# Patient Record
Sex: MALE | Race: BLACK OR AFRICAN AMERICAN | NOT HISPANIC OR LATINO | URBAN - METROPOLITAN AREA
[De-identification: names, ages, dates, MRNs, and addresses within clinical notes are randomized per-mention and may not be internally consistent; named-entity substitution may affect disease eponyms.]

---

## 2021-07-01 ENCOUNTER — EMERGENCY (EMERGENCY)
Facility: HOSPITAL | Age: 41
LOS: 1 days | Discharge: HOME | End: 2021-07-01
Attending: STUDENT IN AN ORGANIZED HEALTH CARE EDUCATION/TRAINING PROGRAM | Admitting: STUDENT IN AN ORGANIZED HEALTH CARE EDUCATION/TRAINING PROGRAM
Payer: MEDICAID

## 2021-07-01 VITALS
SYSTOLIC BLOOD PRESSURE: 128 MMHG | HEART RATE: 72 BPM | TEMPERATURE: 98 F | RESPIRATION RATE: 18 BRPM | DIASTOLIC BLOOD PRESSURE: 61 MMHG | OXYGEN SATURATION: 98 %

## 2021-07-01 PROCEDURE — 99284 EMERGENCY DEPT VISIT MOD MDM: CPT

## 2021-07-01 PROCEDURE — 73090 X-RAY EXAM OF FOREARM: CPT | Mod: 26,RT

## 2021-07-01 PROCEDURE — 73130 X-RAY EXAM OF HAND: CPT | Mod: 26,RT

## 2021-07-01 PROCEDURE — 73110 X-RAY EXAM OF WRIST: CPT | Mod: 26,RT

## 2021-07-01 PROCEDURE — 99281 EMR DPT VST MAYX REQ PHY/QHP: CPT

## 2021-07-01 NOTE — ED PROVIDER NOTE - ATTENDING CONTRIBUTION TO CARE
Frandy Lara DO:  patient seen and evaluated with the resident.  I was present for key portions of the History & Physical, and I agree with the Impression & Plan. 40 yo m w/ r wrist pain/swelling s/p mechanical fall while running yesterday. worsening pain today. fx seen on outpatient imaging. neurovascularly intact. mild deformity and circumferential noted at wrist. r handed, works as . pain controlled. xr, likely ortho consult for possible reduction and splint. reassess.

## 2021-07-01 NOTE — ED PROVIDER NOTE - NS ED ROS FT
General: no fever, no chills  Eyes: no vision changes, no eye pain  Cardiovascular: no chest pain, no edema  Respiratory: no cough, no shortness of breath  Gastrointestinal: no abdominal pain, no nausea, no vomiting, no diarrhea  Genitourinary: no dysuria, no hematuria  Musculoskeletal: +rt wrist pain, no elbow pain   Skin: no rash, no lesions  Neuro: no numbness, no tingling  Psych: no depression, no anxiety

## 2021-07-01 NOTE — ED PROVIDER NOTE - OBJECTIVE STATEMENT
41 year old healthy male, no pmhx, sent to ED from  for evaluation of rt distal radius and ulna fx. Pt reports tripping and falling yesterday, stretched out hand to break fall and then having pain. He denies pain anywhere besides rt wrist. Went to  this AM and had xrays with fx and placed in volar splint. Pt denies any numbness or tingling. No blood thinners. No daily meds. Received Toradol and Tetanus (unclear reason why) at  prior to arrival. Rt hand dominant, works as a .

## 2021-07-01 NOTE — ED PROVIDER NOTE - PROGRESS NOTE DETAILS
Frandy Lara DO: pt signed out pending ortho assessment. Ajay Zamarripa, PGY-2- Patient seen and evaluated by ortho- fracture reduced and splinted. Pt advised to f/u in office. Discussed results of work up with patient. Patient agrees with plan to discharge home. All questions answered regarding plan. Strict return precautions given.

## 2021-07-01 NOTE — CONSULT NOTE ADULT - SUBJECTIVE AND OBJECTIVE BOX
HPI: 41y year old Male RHD s/p mechanical fall.  Landed on outstretched hand.  Patient had deformity and pain in right wrist.  Patient denies pain in any other joint, numbness, tingling, paresthesias. No pain in any other extremities.  No Head trauma or LOC.    PMH:  No pertinent past medical history      [ ] No past medical history    PSH:    [ ] No past surgical history    Allergies:  No Known Allergies      O:  T(C): 36.9 (07-01-21 @ 13:04), Max: 36.9 (07-01-21 @ 13:04)  HR: 72 (07-01-21 @ 13:04) (72 - 72)  BP: 128/61 (07-01-21 @ 13:04) (128/61 - 128/61)  RR: 18 (07-01-21 @ 13:04) (18 - 18)  SpO2: 98% (07-01-21 @ 13:04) (98% - 98%)    Gen  RUE/LUE  AIN/PIN/U Intact  SILT M/U/R  Radial pulse palpable, WWP distally  Skin intact  Swollen wrist        Patient underwent hematoma block.  Injected with 10 cc of 1% lidocaine without epinephrine into hematoma.  Hung in traction and reduced. Patient placed in sugartong splint.  Post reduction x-rays reviewed in improved alignment.  A/P 41y year old man with left/right distal radius fracture  Pain Control  NWB RUE  Sling for comfort  F/u as outpatient in 1 week with Dr Gonzalez [phone]    HPI: 41y year old Male RHD s/p mechanical fall.  Landed on outstretched hand.  Patient had deformity and pain in right wrist.  Patient denies pain in any other joint, numbness, tingling, paresthesias. No pain in any other extremities.  No Head trauma or LOC.    PMH:  No pertinent past medical history      [ ] No past medical history    PSH:    [ ] No past surgical history    Allergies:  No Known Allergies      O:  T(C): 36.9 (07-01-21 @ 13:04), Max: 36.9 (07-01-21 @ 13:04)  HR: 72 (07-01-21 @ 13:04) (72 - 72)  BP: 128/61 (07-01-21 @ 13:04) (128/61 - 128/61)  RR: 18 (07-01-21 @ 13:04) (18 - 18)  SpO2: 98% (07-01-21 @ 13:04) (98% - 98%)    Gen  RUE/LUE  AIN/PIN/U Intact  SILT M/U/R  Radial pulse palpable, WWP distally  Skin intact  Swollen wrist    R wrist XR reveal distal radius fracture with mild shortening         Patient underwent hematoma block.  Injected with 10 cc of 1% lidocaine without epinephrine into hematoma.  Hung in traction and reduced. Patient placed in sugartong splint.  Post reduction x-rays reviewed in improved alignment.  A/P 41y year old man with left/right distal radius fracture  Pain Control  NWB CLINTONE  Sling for comfort  F/u as outpatient in 1 week with Dr Gonzalez [phone]

## 2021-07-01 NOTE — ED ADULT TRIAGE NOTE - CHIEF COMPLAINT QUOTE
sent for further eval of right arm/wrist fracture sent from urgent center  was running and tripped and fall yesterday

## 2021-07-01 NOTE — ED PROVIDER NOTE - NSFOLLOWUPINSTRUCTIONS_ED_ALL_ED_FT
1) Follow up with your orthopedist within 1 week of being seen in the Emergency Department   2) Return to the ED immediately for new or worsening symptoms severe pain, numbness, tingling, color change of arm  3) Please continue to take any home medications as prescribed  4) Your test results from your ED visit were discussed with you prior to discharge  5) You were provided with a copy of your test results  6) Please keep arm elevated, rest, no weight bearing or lifting, take Tylenol and/or Motrin for pain as needed

## 2021-07-01 NOTE — ED PROVIDER NOTE - PHYSICAL EXAMINATION
General: well appearing, no acute distress, AOx3  Skin: no rash, no pallor  Head: normocephalic, atraumatic  Eyes: clear conjunctiva, EOMI  ENMT: airway patent, no nasal discharge  Cardiovascular: normal rate, normal rhythm, S1/S2, 2+ radial pulse rt   Pulmonary: clear to auscultation bilaterally, no rales, rhonchi, or wheeze  Abdomen: soft, nontender  Musculoskeletal: moving extremities well, dorsiflexion of rt wrist, normal sensory, moving digits and elbow well, tender to palpation at rt wrist, normal sensory   Psych: normal mood, normal affect General: well appearing, no acute distress, AOx3  Skin: no rash, no pallor  Head: normocephalic, atraumatic  Eyes: clear conjunctiva, EOMI  ENMT: airway patent, no nasal discharge  Cardiovascular: normal rate, normal rhythm, S1/S2, 2+ radial pulse rt   Pulmonary: clear to auscultation bilaterally, no rales, rhonchi, or wheeze  Abdomen: soft, nontender  Musculoskeletal: moving extremities well, dorsiflexion of rt wrist, normal sensory, moving digits and elbow well, tender to palpation at rt wrist, normal sensory, NO anatomic snuffbox ttp b/l   Psych: normal mood, normal affect  hand: Sensation: SILT in FDWS (radial), SF volar tip (ulnar), and IF (median)  Motor: + TU (radial), OK sign (median), X 2-3 (ulnar), F&E 1-5 against resistance, Wrist/finger extension off table (radial). Finger AB/AD-duction (ulnar), Thumb to pinky (median).   Vascular: CR<2s in all digits

## 2021-07-01 NOTE — ED PROVIDER NOTE - PATIENT PORTAL LINK FT
You can access the FollowMyHealth Patient Portal offered by [facility] by registering at the following website: [email or web address]. By joining [employer]’s FollowMyHealth portal, you will also be able to view your health information using other applications (apps) compatible with our system.

## 2021-07-01 NOTE — ED PROVIDER NOTE - NSFOLLOWUPCLINICS_GEN_ALL_ED_FT
[facility] Orthopedic Surgery  Orthopedic Surgery  [address], [address] [place] [address]  Phone: [phone]  Fax:   Follow Up Time: 1-3 Days

## 2021-07-01 NOTE — ED PROVIDER NOTE - CLINICAL SUMMARY MEDICAL DECISION MAKING FREE TEXT BOX
41 year old male s/p fall yesterday, confirmed radius and ulna right wrist fx on XR. Will repeat XR here, possibly consult ortho, reeval

## 2021-07-02 PROBLEM — Z00.00 ENCOUNTER FOR PREVENTIVE HEALTH EXAMINATION: Status: ACTIVE | Noted: 2021-07-02

## 2021-07-02 PROBLEM — Z78.9 OTHER SPECIFIED HEALTH STATUS: Chronic | Status: ACTIVE | Noted: 2021-07-01

## 2021-07-06 ENCOUNTER — APPOINTMENT (OUTPATIENT)
Dept: ORTHOPEDIC SURGERY | Facility: CLINIC | Age: 41
End: 2021-07-06
Payer: MEDICAID

## 2021-07-06 VITALS — HEIGHT: 72 IN | WEIGHT: 180 LBS | BODY MASS INDEX: 24.38 KG/M2

## 2021-07-06 DIAGNOSIS — Z78.9 OTHER SPECIFIED HEALTH STATUS: ICD-10-CM

## 2021-07-06 DIAGNOSIS — F17.200 NICOTINE DEPENDENCE, UNSPECIFIED, UNCOMPLICATED: ICD-10-CM

## 2021-07-06 PROCEDURE — 99204 OFFICE O/P NEW MOD 45 MIN: CPT

## 2021-07-15 ENCOUNTER — APPOINTMENT (OUTPATIENT)
Dept: ORTHOPEDIC SURGERY | Facility: CLINIC | Age: 41
End: 2021-07-15
Payer: MEDICAID

## 2021-07-15 VITALS
BODY MASS INDEX: 23.86 KG/M2 | DIASTOLIC BLOOD PRESSURE: 81 MMHG | SYSTOLIC BLOOD PRESSURE: 125 MMHG | HEART RATE: 81 BPM | OXYGEN SATURATION: 96 % | HEIGHT: 73 IN | WEIGHT: 180 LBS

## 2021-07-15 PROCEDURE — 73110 X-RAY EXAM OF WRIST: CPT | Mod: RT

## 2021-07-15 PROCEDURE — 99214 OFFICE O/P EST MOD 30 MIN: CPT | Mod: 25

## 2021-07-15 PROCEDURE — 29075 APPL CST ELBW FNGR SHORT ARM: CPT | Mod: RT

## 2021-08-18 ENCOUNTER — APPOINTMENT (OUTPATIENT)
Dept: ORTHOPEDIC SURGERY | Facility: HOSPITAL | Age: 41
End: 2021-08-18

## 2021-09-02 ENCOUNTER — APPOINTMENT (OUTPATIENT)
Dept: ORTHOPEDIC SURGERY | Facility: CLINIC | Age: 41
End: 2021-09-02
Payer: MEDICAID

## 2021-09-02 VITALS
DIASTOLIC BLOOD PRESSURE: 67 MMHG | HEIGHT: 73 IN | SYSTOLIC BLOOD PRESSURE: 126 MMHG | WEIGHT: 180 LBS | BODY MASS INDEX: 23.86 KG/M2 | HEART RATE: 76 BPM | OXYGEN SATURATION: 97 %

## 2021-09-02 PROCEDURE — 73110 X-RAY EXAM OF WRIST: CPT | Mod: RT

## 2021-09-02 PROCEDURE — 99213 OFFICE O/P EST LOW 20 MIN: CPT

## 2021-10-14 ENCOUNTER — APPOINTMENT (OUTPATIENT)
Dept: ORTHOPEDIC SURGERY | Facility: CLINIC | Age: 41
End: 2021-10-14
Payer: MEDICAID

## 2021-11-18 ENCOUNTER — APPOINTMENT (OUTPATIENT)
Dept: ORTHOPEDIC SURGERY | Facility: CLINIC | Age: 41
End: 2021-11-18
Payer: MEDICAID

## 2021-11-18 DIAGNOSIS — S52.571D OTHER INTRAARTICULAR FRACTURE OF LOWER END OF RIGHT RADIUS, SUBSEQUENT ENCOUNTER FOR CLOSED FRACTURE WITH ROUTINE HEALING: ICD-10-CM

## 2021-11-18 PROCEDURE — 99214 OFFICE O/P EST MOD 30 MIN: CPT

## 2021-11-18 PROCEDURE — 73110 X-RAY EXAM OF WRIST: CPT | Mod: RT
